# Patient Record
Sex: MALE | Race: WHITE
[De-identification: names, ages, dates, MRNs, and addresses within clinical notes are randomized per-mention and may not be internally consistent; named-entity substitution may affect disease eponyms.]

---

## 2020-04-16 PROBLEM — Z00.00 ENCOUNTER FOR PREVENTIVE HEALTH EXAMINATION: Status: ACTIVE | Noted: 2020-04-16

## 2020-04-21 ENCOUNTER — APPOINTMENT (OUTPATIENT)
Dept: UROLOGY | Facility: CLINIC | Age: 55
End: 2020-04-21
Payer: COMMERCIAL

## 2020-04-21 VITALS — TEMPERATURE: 98.1 F

## 2020-04-21 VITALS
BODY MASS INDEX: 25.84 KG/M2 | SYSTOLIC BLOOD PRESSURE: 121 MMHG | HEART RATE: 67 BPM | DIASTOLIC BLOOD PRESSURE: 73 MMHG | WEIGHT: 195 LBS | TEMPERATURE: 98 F | RESPIRATION RATE: 17 BRPM | HEIGHT: 73 IN

## 2020-04-21 DIAGNOSIS — Z78.9 OTHER SPECIFIED HEALTH STATUS: ICD-10-CM

## 2020-04-21 DIAGNOSIS — N40.0 BENIGN PROSTATIC HYPERPLASIA WITHOUT LOWER URINARY TRACT SYMPMS: ICD-10-CM

## 2020-04-21 PROCEDURE — 99203 OFFICE O/P NEW LOW 30 MIN: CPT

## 2020-04-21 RX ORDER — PSYLLIUM HUSK 0.4 G
CAPSULE ORAL
Refills: 0 | Status: ACTIVE | COMMUNITY

## 2020-04-21 RX ORDER — MULTIVITAMIN
TABLET ORAL
Refills: 0 | Status: ACTIVE | COMMUNITY

## 2020-04-21 NOTE — HISTORY OF PRESENT ILLNESS
[Nocturia] : nocturia [FreeTextEntry1] : History of spermatocele last year . PSA drawn 2.3 in 2019. Repeat this year in march was 3.8 . 3.6 in 4/1 4/2020 . He is complaining of a slow stream . He takes benadryl every night for sleep

## 2020-04-21 NOTE — PHYSICAL EXAM
[Urethral Meatus] : meatus normal [Penis Abnormality] : normal circumcised penis [Testes Mass (___cm)] : there were no testicular masses [General Appearance - Well Developed] : well developed [General Appearance - Well Nourished] : well nourished [Heart Rate And Rhythm] : Heart rate and rhythm were normal [] : no respiratory distress [Bowel Sounds] : normal bowel sounds [FreeTextEntry1] : dorsal plaque 2.5 cm left hemiscrotom  [Normal Station and Gait] : the gait and station were normal for the patient's age [No Focal Deficits] : no focal deficits [Skin Color & Pigmentation] : normal skin color and pigmentation [No Palpable Adenopathy] : no palpable adenopathy [Oriented To Time, Place, And Person] : oriented to person, place, and time

## 2020-04-21 NOTE — REVIEW OF SYSTEMS
[see HPI] : see HPI [Wake up at night to urinate  How many times?  ___] : wakes up to urinate [unfilled] times during the night [Interrupted urine stream] : interrupted urine stream [Slow urine stream] : slow urine stream [Negative] : Heme/Lymph

## 2020-04-21 NOTE — ASSESSMENT
[FreeTextEntry1] : He has a elevated PSA . We discussed that the Benadryl will affect the stream . We will order the MRI due to PSA elevating by 1 point in the past year . \par He understands the plan .\par I-PSS 11\par QOL 2

## 2020-04-29 ENCOUNTER — APPOINTMENT (OUTPATIENT)
Dept: MRI IMAGING | Facility: CLINIC | Age: 55
End: 2020-04-29

## 2020-05-26 ENCOUNTER — OUTPATIENT (OUTPATIENT)
Dept: OUTPATIENT SERVICES | Facility: HOSPITAL | Age: 55
LOS: 1 days | End: 2020-05-26
Payer: COMMERCIAL

## 2020-05-26 ENCOUNTER — APPOINTMENT (OUTPATIENT)
Dept: MRI IMAGING | Facility: CLINIC | Age: 55
End: 2020-05-26
Payer: COMMERCIAL

## 2020-05-26 ENCOUNTER — RESULT REVIEW (OUTPATIENT)
Age: 55
End: 2020-05-26

## 2020-05-26 DIAGNOSIS — Z00.8 ENCOUNTER FOR OTHER GENERAL EXAMINATION: ICD-10-CM

## 2020-05-26 DIAGNOSIS — R97.20 ELEVATED PROSTATE SPECIFIC ANTIGEN [PSA]: ICD-10-CM

## 2020-05-26 PROCEDURE — 72197 MRI PELVIS W/O & W/DYE: CPT | Mod: 26

## 2020-05-26 PROCEDURE — 76377 3D RENDER W/INTRP POSTPROCES: CPT

## 2020-05-26 PROCEDURE — 72197 MRI PELVIS W/O & W/DYE: CPT

## 2020-05-26 PROCEDURE — A9585: CPT

## 2020-05-26 PROCEDURE — 76377 3D RENDER W/INTRP POSTPROCES: CPT | Mod: 26

## 2020-06-11 ENCOUNTER — OUTPATIENT (OUTPATIENT)
Dept: OUTPATIENT SERVICES | Facility: HOSPITAL | Age: 55
LOS: 1 days | End: 2020-06-11
Payer: COMMERCIAL

## 2020-06-11 ENCOUNTER — APPOINTMENT (OUTPATIENT)
Dept: UROLOGY | Facility: CLINIC | Age: 55
End: 2020-06-11
Payer: COMMERCIAL

## 2020-06-11 VITALS — SYSTOLIC BLOOD PRESSURE: 120 MMHG | HEART RATE: 62 BPM | DIASTOLIC BLOOD PRESSURE: 86 MMHG

## 2020-06-11 VITALS — RESPIRATION RATE: 17 BRPM | HEART RATE: 75 BPM | DIASTOLIC BLOOD PRESSURE: 94 MMHG | SYSTOLIC BLOOD PRESSURE: 134 MMHG

## 2020-06-11 VITALS — TEMPERATURE: 97.4 F

## 2020-06-11 DIAGNOSIS — R35.0 FREQUENCY OF MICTURITION: ICD-10-CM

## 2020-06-11 DIAGNOSIS — R97.20 ELEVATED PROSTATE, SPECIFIC ANTIGEN [PSA]: ICD-10-CM

## 2020-06-11 PROCEDURE — 76872 US TRANSRECTAL: CPT | Mod: 26

## 2020-06-11 PROCEDURE — 55700: CPT

## 2020-06-11 PROCEDURE — 76377 3D RENDER W/INTRP POSTPROCES: CPT | Mod: 26

## 2020-06-11 PROCEDURE — 55700: CPT | Mod: 22

## 2020-06-11 PROCEDURE — 76942 ECHO GUIDE FOR BIOPSY: CPT | Mod: 59

## 2020-06-11 PROCEDURE — 76942 ECHO GUIDE FOR BIOPSY: CPT | Mod: 26,59

## 2020-06-11 PROCEDURE — 76872 US TRANSRECTAL: CPT

## 2020-06-16 LAB — CORE LAB BIOPSY: NORMAL

## 2020-06-16 RX ORDER — DIAZEPAM 5 MG/1
5 TABLET ORAL
Qty: 1 | Refills: 0 | Status: DISCONTINUED | COMMUNITY
Start: 2020-06-08 | End: 2020-06-16

## 2020-06-17 DIAGNOSIS — R97.20 ELEVATED PROSTATE SPECIFIC ANTIGEN [PSA]: ICD-10-CM

## 2020-06-17 DIAGNOSIS — R93.5 ABNORMAL FINDINGS ON DIAGNOSTIC IMAGING OF OTHER ABDOMINAL REGIONS, INCLUDING RETROPERITONEUM: ICD-10-CM

## 2020-06-23 ENCOUNTER — APPOINTMENT (OUTPATIENT)
Dept: UROLOGY | Facility: CLINIC | Age: 55
End: 2020-06-23
Payer: COMMERCIAL

## 2020-06-23 VITALS
WEIGHT: 196 LBS | BODY MASS INDEX: 25.98 KG/M2 | DIASTOLIC BLOOD PRESSURE: 79 MMHG | SYSTOLIC BLOOD PRESSURE: 137 MMHG | HEIGHT: 73 IN | HEART RATE: 68 BPM

## 2020-06-23 VITALS — TEMPERATURE: 97.8 F

## 2020-06-23 DIAGNOSIS — R93.5 ABNORMAL FINDINGS ON DIAGNOSTIC IMAGING OF OTHER ABDOMINAL REGIONS, INCLUDING RETROPERITONEUM: ICD-10-CM

## 2020-06-23 DIAGNOSIS — Z78.9 OTHER SPECIFIED HEALTH STATUS: ICD-10-CM

## 2020-06-23 DIAGNOSIS — C61 MALIGNANT NEOPLASM OF PROSTATE: ICD-10-CM

## 2020-06-23 PROCEDURE — 99215 OFFICE O/P EST HI 40 MIN: CPT

## 2020-10-25 ENCOUNTER — INPATIENT (INPATIENT)
Facility: HOSPITAL | Age: 55
LOS: 0 days | Discharge: HOME | DRG: 698 | End: 2020-10-26
Attending: INTERNAL MEDICINE | Admitting: INTERNAL MEDICINE
Payer: COMMERCIAL

## 2020-10-25 VITALS
HEART RATE: 72 BPM | WEIGHT: 186.95 LBS | TEMPERATURE: 98 F | SYSTOLIC BLOOD PRESSURE: 140 MMHG | RESPIRATION RATE: 22 BRPM | DIASTOLIC BLOOD PRESSURE: 110 MMHG | HEIGHT: 73 IN | OXYGEN SATURATION: 100 %

## 2020-10-25 LAB
ALBUMIN SERPL ELPH-MCNC: 3.3 G/DL — SIGNIFICANT CHANGE UP (ref 3.3–5)
ALP SERPL-CCNC: 60 U/L — SIGNIFICANT CHANGE UP (ref 40–120)
ALT FLD-CCNC: 25 U/L — SIGNIFICANT CHANGE UP (ref 12–78)
ANION GAP SERPL CALC-SCNC: 8 MMOL/L — SIGNIFICANT CHANGE UP (ref 5–17)
APTT BLD: 24.6 SEC — LOW (ref 27.5–35.5)
AST SERPL-CCNC: 21 U/L — SIGNIFICANT CHANGE UP (ref 15–37)
BASOPHILS # BLD AUTO: 0.08 K/UL — SIGNIFICANT CHANGE UP (ref 0–0.2)
BASOPHILS NFR BLD AUTO: 0.4 % — SIGNIFICANT CHANGE UP (ref 0–2)
BILIRUB SERPL-MCNC: 0.8 MG/DL — SIGNIFICANT CHANGE UP (ref 0.2–1.2)
BUN SERPL-MCNC: 13 MG/DL — SIGNIFICANT CHANGE UP (ref 7–23)
CALCIUM SERPL-MCNC: 8.6 MG/DL — SIGNIFICANT CHANGE UP (ref 8.5–10.1)
CHLORIDE SERPL-SCNC: 106 MMOL/L — SIGNIFICANT CHANGE UP (ref 96–108)
CO2 SERPL-SCNC: 26 MMOL/L — SIGNIFICANT CHANGE UP (ref 22–31)
CREAT SERPL-MCNC: 1.16 MG/DL — SIGNIFICANT CHANGE UP (ref 0.5–1.3)
EOSINOPHIL # BLD AUTO: 0.49 K/UL — SIGNIFICANT CHANGE UP (ref 0–0.5)
EOSINOPHIL NFR BLD AUTO: 2.7 % — SIGNIFICANT CHANGE UP (ref 0–6)
GLUCOSE SERPL-MCNC: 131 MG/DL — HIGH (ref 70–99)
HCT VFR BLD CALC: 36.4 % — LOW (ref 39–50)
HGB BLD-MCNC: 12.1 G/DL — LOW (ref 13–17)
IMM GRANULOCYTES NFR BLD AUTO: 0.5 % — SIGNIFICANT CHANGE UP (ref 0–1.5)
INR BLD: 1.06 RATIO — SIGNIFICANT CHANGE UP (ref 0.88–1.16)
LYMPHOCYTES # BLD AUTO: 1.46 K/UL — SIGNIFICANT CHANGE UP (ref 1–3.3)
LYMPHOCYTES # BLD AUTO: 8.2 % — LOW (ref 13–44)
MCHC RBC-ENTMCNC: 30.3 PG — SIGNIFICANT CHANGE UP (ref 27–34)
MCHC RBC-ENTMCNC: 33.2 GM/DL — SIGNIFICANT CHANGE UP (ref 32–36)
MCV RBC AUTO: 91 FL — SIGNIFICANT CHANGE UP (ref 80–100)
MONOCYTES # BLD AUTO: 1.23 K/UL — HIGH (ref 0–0.9)
MONOCYTES NFR BLD AUTO: 6.9 % — SIGNIFICANT CHANGE UP (ref 2–14)
NEUTROPHILS # BLD AUTO: 14.55 K/UL — HIGH (ref 1.8–7.4)
NEUTROPHILS NFR BLD AUTO: 81.3 % — HIGH (ref 43–77)
PLATELET # BLD AUTO: 281 K/UL — SIGNIFICANT CHANGE UP (ref 150–400)
POTASSIUM SERPL-MCNC: 3.5 MMOL/L — SIGNIFICANT CHANGE UP (ref 3.5–5.3)
POTASSIUM SERPL-SCNC: 3.5 MMOL/L — SIGNIFICANT CHANGE UP (ref 3.5–5.3)
PROT SERPL-MCNC: 6.1 GM/DL — SIGNIFICANT CHANGE UP (ref 6–8.3)
PROTHROM AB SERPL-ACNC: 12.3 SEC — SIGNIFICANT CHANGE UP (ref 10.6–13.6)
RBC # BLD: 4 M/UL — LOW (ref 4.2–5.8)
RBC # FLD: 11.7 % — SIGNIFICANT CHANGE UP (ref 10.3–14.5)
SODIUM SERPL-SCNC: 140 MMOL/L — SIGNIFICANT CHANGE UP (ref 135–145)
WBC # BLD: 17.9 K/UL — HIGH (ref 3.8–10.5)
WBC # FLD AUTO: 17.9 K/UL — HIGH (ref 3.8–10.5)

## 2020-10-25 PROCEDURE — 74177 CT ABD & PELVIS W/CONTRAST: CPT | Mod: 26

## 2020-10-25 RX ORDER — ONDANSETRON 8 MG/1
4 TABLET, FILM COATED ORAL ONCE
Refills: 0 | Status: COMPLETED | OUTPATIENT
Start: 2020-10-25 | End: 2020-10-25

## 2020-10-25 RX ORDER — SODIUM CHLORIDE 9 MG/ML
1000 INJECTION INTRAMUSCULAR; INTRAVENOUS; SUBCUTANEOUS ONCE
Refills: 0 | Status: COMPLETED | OUTPATIENT
Start: 2020-10-25 | End: 2020-10-25

## 2020-10-25 RX ORDER — MORPHINE SULFATE 50 MG/1
4 CAPSULE, EXTENDED RELEASE ORAL ONCE
Refills: 0 | Status: DISCONTINUED | OUTPATIENT
Start: 2020-10-25 | End: 2020-10-25

## 2020-10-25 RX ADMIN — SODIUM CHLORIDE 1000 MILLILITER(S): 9 INJECTION INTRAMUSCULAR; INTRAVENOUS; SUBCUTANEOUS at 22:51

## 2020-10-25 RX ADMIN — ONDANSETRON 4 MILLIGRAM(S): 8 TABLET, FILM COATED ORAL at 21:14

## 2020-10-25 RX ADMIN — MORPHINE SULFATE 4 MILLIGRAM(S): 50 CAPSULE, EXTENDED RELEASE ORAL at 21:14

## 2020-10-25 RX ADMIN — SODIUM CHLORIDE 1000 MILLILITER(S): 9 INJECTION INTRAMUSCULAR; INTRAVENOUS; SUBCUTANEOUS at 23:51

## 2020-10-25 RX ADMIN — MORPHINE SULFATE 4 MILLIGRAM(S): 50 CAPSULE, EXTENDED RELEASE ORAL at 20:44

## 2020-10-25 RX ADMIN — ONDANSETRON 4 MILLIGRAM(S): 8 TABLET, FILM COATED ORAL at 22:51

## 2020-10-25 NOTE — ED PROVIDER NOTE - PROGRESS NOTE DETAILS
Discussed with patient's physician. [phone].  Will let know what CT shows. Cameron Villagran D.O. AS:  Received patient at shift change pending CT findings. Pt stable, complains of intermittent sharp stabbing pains.  D/w Dr Guzmán (surgery [facility]) and reviewed CT findings.  Most likely post operative changes but moderate amount of hemoperitoneum less usual.  Would recommend repeating CBC as the fluid is likely old and collected from surgery. AS:  pt remains in pain, episodes of sharp pain come and go.  nontender on exam but cannot stand secondary to pain.  gambino catheter continues to drain and he has no complaints of pain in the penis AS:  case d/w general surgery resident; recommends repeating CT as CTA to r/o active bleeding with the hemoperitoneum and patient's level of pain as general surgery would likely not take this stable pt to OR for ex lap and pt would otherwise require IR if CTA abnormal. AS:  case d/w Dr Romano, covering urology for HH.  Recommends a dose of oxybutinin as symptoms are likely secondary to bladder spasms.  Cannot say that the tug on his catheter would otherwise cause active bleeding leading to the hemoperitoneum.  Recommends small amount of irrigation to the gambino to verify no issues with gambino and reassess after oxybutinin.  If pt still in pain would suggest admission to medicine for pain control case d/w Dr Helm, will admit to medicine service for pain control and further monitoring

## 2020-10-25 NOTE — ED ADULT NURSE NOTE - CHPI ED NUR SYMPTOMS NEG
no nausea/no fever/no chills/no diarrhea/no dysuria/no vomiting/no hematuria/no abdominal distension/no blood in stool/no burning urination

## 2020-10-25 NOTE — ED ADULT NURSE NOTE - CHIEF COMPLAINT QUOTE
history of prostate ca, had surgery on Tuesday at [place], complaining of Lindo cathter pain lower abdominal pain, onset began earlier this evening 100mcg fentanyl given by ems pta

## 2020-10-25 NOTE — ED ADULT NURSE NOTE - OBJECTIVE STATEMENT
Patient A&Ox4 brought in by EMS c/o abdominal pain.  Patient had prostate surgery on tuesday at [facility], has urinary catheter in place.  Patient reports adjusting pants in car and pulled catheter, has been having intermittent waves of left lower sided sharp abdominal pain rating 10/10.  Patient got 10 mcg of fentanyl PTA by EMS, 18G IV in L AC in place upon arrival.  Patient has multiple incision wounds on abdomen that are red with no Patient A&Ox4 brought in by EMS c/o abdominal pain.  Patient had prostatectomy on tuesday at [place], has urinary catheter in place.  Patient reports adjusting pants in car and accidentally pulled catheter, has been having intermittent waves of left lower sided sharp abdominal pain rating 10/10.   Patient reports feeling faint and diaphoretic due to pain.  Patient got 10 mcg of fentanyl PTA by EMS, 18G IV in L AC in place upon arrival.  Patient has multiple incisions on abdomen that are pink with no drainage noted.  PMH prostate Ca.

## 2020-10-25 NOTE — ED PROVIDER NOTE - OBJECTIVE STATEMENT
56 y/o male with a PMHx of prostate CA, presents to the ED BIBEMS from home c/o lower abdominal pain today. Pt reports recent robotic prostatectomy at [facility] in [facility] for prostate CA. Pt states tonight he accidentally pulled out his Lindo catheter and following doing so had abdominal pain up to 10/10 in severity. States he almost passed out because of the pain and called EMS to be brought to the ED for further evaluation. EMS administered 100mcg fentanyl en route. No other complaints at this time. Allergies: Penicillin

## 2020-10-25 NOTE — ED ADULT TRIAGE NOTE - CHIEF COMPLAINT QUOTE
history of prostate ca, had surgery on Tuesday at [place], complaining of Lindo cathter pain lower abdominal pain, onset began earlier this evening 100mcg fentanyl given by ems pta

## 2020-10-25 NOTE — ED PROVIDER NOTE - SKIN, MLM
Skin normal color for race, warm, dry and intact. No evidence of rash. +surgical incisions well healing, no signs of infection.

## 2020-10-25 NOTE — ED PROVIDER NOTE - CARE PLAN
Principal Discharge DX:	Bladder spasms  Secondary Diagnosis:	Abdominal pain  Secondary Diagnosis:	Hemoperitoneum

## 2020-10-26 ENCOUNTER — TRANSCRIPTION ENCOUNTER (OUTPATIENT)
Age: 55
End: 2020-10-26

## 2020-10-26 VITALS
SYSTOLIC BLOOD PRESSURE: 128 MMHG | OXYGEN SATURATION: 100 % | DIASTOLIC BLOOD PRESSURE: 73 MMHG | TEMPERATURE: 98 F | RESPIRATION RATE: 18 BRPM | HEART RATE: 75 BPM

## 2020-10-26 DIAGNOSIS — N32.89 OTHER SPECIFIED DISORDERS OF BLADDER: ICD-10-CM

## 2020-10-26 LAB
APPEARANCE UR: CLEAR — SIGNIFICANT CHANGE UP
BASOPHILS # BLD AUTO: 0.03 K/UL — SIGNIFICANT CHANGE UP (ref 0–0.2)
BASOPHILS NFR BLD AUTO: 0.2 % — SIGNIFICANT CHANGE UP (ref 0–2)
BILIRUB UR-MCNC: NEGATIVE — SIGNIFICANT CHANGE UP
COLOR SPEC: YELLOW — SIGNIFICANT CHANGE UP
DIFF PNL FLD: ABNORMAL
EOSINOPHIL # BLD AUTO: 0.05 K/UL — SIGNIFICANT CHANGE UP (ref 0–0.5)
EOSINOPHIL NFR BLD AUTO: 0.4 % — SIGNIFICANT CHANGE UP (ref 0–6)
GLUCOSE UR QL: NEGATIVE MG/DL — SIGNIFICANT CHANGE UP
HCT VFR BLD CALC: 31.8 % — LOW (ref 39–50)
HGB BLD-MCNC: 10.6 G/DL — LOW (ref 13–17)
IMM GRANULOCYTES NFR BLD AUTO: 0.4 % — SIGNIFICANT CHANGE UP (ref 0–1.5)
KETONES UR-MCNC: NEGATIVE — SIGNIFICANT CHANGE UP
LACTATE SERPL-SCNC: 1.3 MMOL/L — SIGNIFICANT CHANGE UP (ref 0.7–2)
LEUKOCYTE ESTERASE UR-ACNC: ABNORMAL
LYMPHOCYTES # BLD AUTO: 0.58 K/UL — LOW (ref 1–3.3)
LYMPHOCYTES # BLD AUTO: 4.1 % — LOW (ref 13–44)
MCHC RBC-ENTMCNC: 30.5 PG — SIGNIFICANT CHANGE UP (ref 27–34)
MCHC RBC-ENTMCNC: 33.3 GM/DL — SIGNIFICANT CHANGE UP (ref 32–36)
MCV RBC AUTO: 91.4 FL — SIGNIFICANT CHANGE UP (ref 80–100)
MONOCYTES # BLD AUTO: 0.8 K/UL — SIGNIFICANT CHANGE UP (ref 0–0.9)
MONOCYTES NFR BLD AUTO: 5.7 % — SIGNIFICANT CHANGE UP (ref 2–14)
NEUTROPHILS # BLD AUTO: 12.56 K/UL — HIGH (ref 1.8–7.4)
NEUTROPHILS NFR BLD AUTO: 89.2 % — HIGH (ref 43–77)
NITRITE UR-MCNC: NEGATIVE — SIGNIFICANT CHANGE UP
PH UR: 6.5 — SIGNIFICANT CHANGE UP (ref 5–8)
PLATELET # BLD AUTO: 241 K/UL — SIGNIFICANT CHANGE UP (ref 150–400)
PROT UR-MCNC: 15 MG/DL
RBC # BLD: 3.48 M/UL — LOW (ref 4.2–5.8)
RBC # FLD: 11.8 % — SIGNIFICANT CHANGE UP (ref 10.3–14.5)
SARS-COV-2 IGG SERPL QL IA: NEGATIVE — SIGNIFICANT CHANGE UP
SARS-COV-2 IGM SERPL IA-ACNC: <0.1 INDEX — SIGNIFICANT CHANGE UP
SARS-COV-2 RNA SPEC QL NAA+PROBE: SIGNIFICANT CHANGE UP
SP GR SPEC: 1.01 — SIGNIFICANT CHANGE UP (ref 1.01–1.02)
UROBILINOGEN FLD QL: NEGATIVE MG/DL — SIGNIFICANT CHANGE UP
WBC # BLD: 14.07 K/UL — HIGH (ref 3.8–10.5)
WBC # FLD AUTO: 14.07 K/UL — HIGH (ref 3.8–10.5)

## 2020-10-26 PROCEDURE — 99236 HOSP IP/OBS SAME DATE HI 85: CPT

## 2020-10-26 PROCEDURE — 74174 CTA ABD&PLVS W/CONTRAST: CPT | Mod: 26

## 2020-10-26 RX ORDER — OXYCODONE AND ACETAMINOPHEN 5; 325 MG/1; MG/1
1 TABLET ORAL EVERY 4 HOURS
Refills: 0 | Status: DISCONTINUED | OUTPATIENT
Start: 2020-10-26 | End: 2020-10-26

## 2020-10-26 RX ORDER — OXYBUTYNIN CHLORIDE 5 MG
5 TABLET ORAL
Refills: 0 | Status: DISCONTINUED | OUTPATIENT
Start: 2020-10-26 | End: 2020-10-26

## 2020-10-26 RX ORDER — SENNA PLUS 8.6 MG/1
2 TABLET ORAL AT BEDTIME
Refills: 0 | Status: DISCONTINUED | OUTPATIENT
Start: 2020-10-26 | End: 2020-10-26

## 2020-10-26 RX ORDER — ONDANSETRON 8 MG/1
1 TABLET, FILM COATED ORAL
Qty: 6 | Refills: 0
Start: 2020-10-26 | End: 2020-10-27

## 2020-10-26 RX ORDER — IBUPROFEN 200 MG
1 TABLET ORAL
Qty: 0 | Refills: 0 | DISCHARGE

## 2020-10-26 RX ORDER — PREGABALIN 225 MG/1
1000 CAPSULE ORAL DAILY
Refills: 0 | Status: DISCONTINUED | OUTPATIENT
Start: 2020-10-26 | End: 2020-10-26

## 2020-10-26 RX ORDER — ACETAMINOPHEN 500 MG
650 TABLET ORAL EVERY 8 HOURS
Refills: 0 | Status: DISCONTINUED | OUTPATIENT
Start: 2020-10-26 | End: 2020-10-26

## 2020-10-26 RX ORDER — CEFTRIAXONE 500 MG/1
1000 INJECTION, POWDER, FOR SOLUTION INTRAMUSCULAR; INTRAVENOUS ONCE
Refills: 0 | Status: DISCONTINUED | OUTPATIENT
Start: 2020-10-26 | End: 2020-10-26

## 2020-10-26 RX ORDER — PREGABALIN 225 MG/1
1 CAPSULE ORAL
Qty: 0 | Refills: 0 | DISCHARGE

## 2020-10-26 RX ORDER — OXYBUTYNIN CHLORIDE 5 MG
10 TABLET ORAL ONCE
Refills: 0 | Status: COMPLETED | OUTPATIENT
Start: 2020-10-26 | End: 2020-10-26

## 2020-10-26 RX ORDER — OXYBUTYNIN CHLORIDE 5 MG
1 TABLET ORAL
Qty: 30 | Refills: 0
Start: 2020-10-26 | End: 2020-11-04

## 2020-10-26 RX ORDER — SENNA PLUS 8.6 MG/1
2 TABLET ORAL
Qty: 10 | Refills: 0
Start: 2020-10-26 | End: 2020-10-30

## 2020-10-26 RX ORDER — CEFTRIAXONE 500 MG/1
1000 INJECTION, POWDER, FOR SOLUTION INTRAMUSCULAR; INTRAVENOUS ONCE
Refills: 0 | Status: COMPLETED | OUTPATIENT
Start: 2020-10-26 | End: 2020-10-26

## 2020-10-26 RX ORDER — POLYETHYLENE GLYCOL 3350 17 G/17G
17 POWDER, FOR SOLUTION ORAL
Refills: 0 | Status: DISCONTINUED | OUTPATIENT
Start: 2020-10-26 | End: 2020-10-26

## 2020-10-26 RX ORDER — MORPHINE SULFATE 50 MG/1
4 CAPSULE, EXTENDED RELEASE ORAL ONCE
Refills: 0 | Status: DISCONTINUED | OUTPATIENT
Start: 2020-10-26 | End: 2020-10-26

## 2020-10-26 RX ORDER — OXYBUTYNIN CHLORIDE 5 MG
1 TABLET ORAL
Qty: 20 | Refills: 0
Start: 2020-10-26 | End: 2020-11-04

## 2020-10-26 RX ORDER — MORPHINE SULFATE 50 MG/1
4 CAPSULE, EXTENDED RELEASE ORAL EVERY 6 HOURS
Refills: 0 | Status: DISCONTINUED | OUTPATIENT
Start: 2020-10-26 | End: 2020-10-26

## 2020-10-26 RX ORDER — ONDANSETRON 8 MG/1
4 TABLET, FILM COATED ORAL EVERY 6 HOURS
Refills: 0 | Status: DISCONTINUED | OUTPATIENT
Start: 2020-10-26 | End: 2020-10-26

## 2020-10-26 RX ORDER — ACETAMINOPHEN 500 MG
2 TABLET ORAL
Qty: 0 | Refills: 0 | DISCHARGE
Start: 2020-10-26

## 2020-10-26 RX ADMIN — CEFTRIAXONE 1000 MILLIGRAM(S): 500 INJECTION, POWDER, FOR SOLUTION INTRAMUSCULAR; INTRAVENOUS at 04:55

## 2020-10-26 RX ADMIN — Medication 10 MILLIGRAM(S): at 04:56

## 2020-10-26 RX ADMIN — MORPHINE SULFATE 4 MILLIGRAM(S): 50 CAPSULE, EXTENDED RELEASE ORAL at 04:56

## 2020-10-26 RX ADMIN — MORPHINE SULFATE 4 MILLIGRAM(S): 50 CAPSULE, EXTENDED RELEASE ORAL at 05:26

## 2020-10-26 RX ADMIN — ONDANSETRON 4 MILLIGRAM(S): 8 TABLET, FILM COATED ORAL at 10:06

## 2020-10-26 NOTE — DISCHARGE NOTE PROVIDER - NSDCCPCAREPLAN_GEN_ALL_CORE_FT
PRINCIPAL DISCHARGE DIAGNOSIS  Diagnosis: Bladder spasms  Assessment and Plan of Treatment: pain meds, oxybutynin

## 2020-10-26 NOTE — H&P ADULT - ASSESSMENT
Severe abdo pain s/p tugging at gambino  s/p robotic prostatectomy last week/[facility]/Dr Garvin    PLan:  morphine IV/percocet   oxybutynin   discussed with DR Romano and RN   OOB to chair/ambulate as tolerated  if pain better then plan to dc home later in the day and f/u with Dr Garvin  CT imaging does not show any active bleeding    Severe abdo pain s/p tugging at gambino  s/p robotic prostatectomy last week/[facility]/Dr Garvin  bladder wall thickening; hemoperitoneum - post op finding     PLan:  morphine IV/percocet   oxybutynin   CT imaging does not show any active bleeding   for cystitis cont rocephin here - will send home on ceftin for a week as he has gambino etc.  discussed with DR Romano and RN   OOB to chair/ambulate as tolerated  if pain better then plan to dc home later in the day and f/u with Dr Garvin

## 2020-10-26 NOTE — ED ADULT NURSE REASSESSMENT NOTE - NS ED NURSE REASSESS COMMENT FT1
Lindo irrigated with about 30 mL of sterile water, no discomfort noted by patient.  Lindo catheter draining well with clear, yellow urine.

## 2020-10-26 NOTE — H&P ADULT - NSHPPHYSICALEXAM_GEN_ALL_CORE
PHYSICAL EXAM:  GENERAL: NAD, able to lie flat in bed  HEAD:  Atraumatic, Normocephalic  EYES: EOMI, PERRLA, normal sclera  ENT: Moist mucous membranes  NECK: Supple, No JVD, no nuchal rigidity  CHEST/LUNG: Clear to auscultation bilaterally; No rales, rhonchi, wheezing, or rubs. Unlabored respirations  HEART: Regular rate and rhythm; No murmurs, rubs, or gallops  ABDOMEN: Bowel sounds present; Soft, tenderness low abdo; post-surgical, incision sites are healthy   Lindo with clear urine in bag   EXTREMITIES:  no pitting bilaterally  NERVOUS SYSTEM:  Alert & Oriented X3, speech clear. No focal motor or sensory deficits  MSK: FROM all 4 extremities, full and equal strength  SKIN: No rashes or lesions

## 2020-10-26 NOTE — ED ADULT NURSE REASSESSMENT NOTE - NS ED NURSE REASSESS COMMENT FT1
Pt tolerated PO intake without complications. Pt is now resting comfortably in his bed without any complications. Pt denies any pain or discomfort. Safety and comfort measures in place. Hourly rounding will be done on my time. Will continue to monitor.

## 2020-10-26 NOTE — DISCHARGE NOTE NURSING/CASE MANAGEMENT/SOCIAL WORK - PATIENT PORTAL LINK FT
You can access the FollowMyHealth Patient Portal offered by [facility] by registering at the following website: [email or web address]. By joining [employer]’s FollowMyHealth portal, you will also be able to view your health information using other applications (apps) compatible with our system.

## 2020-10-26 NOTE — PHARMACOTHERAPY INTERVENTION NOTE - COMMENTS
med history complete, reviewed medications with patient and confirmed with doctor first med profile, all medication related questions answered

## 2020-10-26 NOTE — DISCHARGE NOTE PROVIDER - HOSPITAL COURSE
Pleasant 56 y/o male with  prostate CA, s/p robotic prostatectomy at [facility] on 10/20/20 by Dr Rivera.  Pt well until last evening when  he accidentally tugged at his  Gambino catheter, and several mins later developed such excruciating low abdo pain that he couldn't breathe and he  almost passed out. Per ER notes pt was given fentanyl by EMS.   Pt got oxybutynin and morphine here, pain is tolerable, low abdo feels a little tender. No cp palps sob is passing gas. No pain in the urethra.   10 point ROS is otherwise negative.    PMH:  denies any h/o MI CAD CVA or other Ca  Prostate Ca    FH:  parents and sibs are healthy    SH:  never smoked; occ red wine; no recreational drugsGENERAL: NAD, able to lie flat in bed  HEAD:  Atraumatic, Normocephalic  EYES: EOMI, PERRLA, normal sclera  ENT: Moist mucous membranes  NECK: Supple, No JVD, no nuchal rigidity  CHEST/LUNG: Clear to auscultation bilaterally; No rales, rhonchi, wheezing, or rubs. Unlabored respirations  HEART: Regular rate and rhythm; No murmurs, rubs, or gallops  ABDOMEN: Bowel sounds present; Soft, tenderness low abdo; post-surgical, incision sites are healthy   Gambino with clear urine in bag   EXTREMITIES:  no pitting bilaterally  NERVOUS SYSTEM:  Alert & Oriented X3, speech clear. No focal motor or sensory deficits  MSK: FROM all 4 extremities, full and equal strength  SKIN: No rashes or lesionsLABS: All Labs Reviewed:                        10.6   14.07 )-----------( 241      ( 26 Oct 2020 01:56 )             31.8     140  |  106  |  13  ----------------------------<  131<H>  3.5   |  26  |  1.16      RADIOLOGY/EKG:  < from: CT Angio Abdomen and Pelvis w/ IV Cont (10.26.20 @ 05:22) >  1. Small to moderate amount of abdominal and pelvic hemoperitoneum, not significant changed, without evidence of active intravenous contrast extravasation.  2. Circumferential urinary bladder wall thickening which could be postprocedural in nature versus a cystitis. Correlate with urinalysisSevere abdo pain s/p tugging at gambino      A/P    s/p robotic prostatectomy last week/[facility]/Dr Rivera  bladder wall thickening; hemoperitoneum - post op finding     PLan:  morphine IV/percocet  - pain now controlled  and pt stable for discharge   oxybutynin per Urology, will continue   CT imaging does not show any active bleeding   for cystitis got rocephin here   will hold off on further antibiotics   discussed with DR Romano and RN   OOB to chair/ambulate as tolerated  as  pain better plan to dc home later in the day and f/u with Dr Rivera    POC discussed with patient, RN and Dr Romano   Pt has percocet at home     time spent on discharge - 55 mins

## 2020-10-26 NOTE — ED ADULT NURSE REASSESSMENT NOTE - NS ED NURSE REASSESS COMMENT FT1
pt had an episode of emesis. Pt stated that he felt better and wanted to try and ambulate. pt ambulated x2 around the er. Pt denies any pain or discomfort during ambulation trail. Updated Dr. Guerrero. MD stated to allow pt to eat/ tolerate lunch. Pt will possible be discharged home. Safety and comfort measures in place. Hourly rounding will be done on my time. Will continue to monitor.

## 2020-10-26 NOTE — ED ADULT NURSE REASSESSMENT NOTE - NS ED NURSE REASSESS COMMENT FT1
Pt being D/C to be home. IV removed and bleeding. Discharged instruction provided to pt, all questions answered. Pt verbalized understanding. Pt ambulated to the exit without any complications.

## 2020-10-26 NOTE — CONSULT NOTE ADULT - SUBJECTIVE AND OBJECTIVE BOX
UROLOGY CONSULTATION    BECKA IBRAHIM  [id number]    [facility]  Patient is a 55y yo Male who is admitted for abdominal pain    Patient [place] [facility] [place].  Yesterday he had an accidental tug on his gambino catheter and subsequently developed severe intermittent, sharp lower abdominal pain radiating to his shoulders  CT showed a lymphocele and small amount of pneumoperitoneum.    Patient seen and examined at bedside.     Current complaints: none, overall he feels better    +BMs, flatus    Denies dysuria, urgency, frequency, gross hematuria, fevers, chills, nausea, vomiting or weight loss    ROS  12 point ROS negative except that outlined in HPI    PMHX  Other disorder of bladder        MEDS  ondansetron Injectable 4 milliGRAM(s) IV Push every 6 hours PRN      Allergies  penicillin (Rash)        VITALS  T(C): 36.8 (10-26-20 @ 07:21), Max: 36.8 (10-26-20 @ 00:52)  HR: 67 (10-26-20 @ 07:21)  BP: 124/68 (10-26-20 @ 07:21)  RR: 18 (10-26-20 @ 07:21)  SpO2: 98% (10-26-20 @ 07:21)      Gen: middle aged Male in NAD, well nourished  HEENT: normocephalic, anicteric sclera, moist mucus membranes; hearing intact  Chest: non labored breathing  Abd: +BS, incisions healing well without erythema or drainage, soft, mild lower abdominal tenderness to deep palpation, ND, no rebound or guarding  : no suprapubic distension or tenderness, gambino in situ draining clear yellow urine, bilateral testes without tenderness or swelling  Psych: AAOx3, normal affect & mood  Ext: moves all four extremities freely, no peripheral edema    LABS  10-25    140  |  106  |  13  ----------------------------<  131<H>  3.5   |  26  |  1.16    Ca    8.6      25 Oct 2020 20:45    TPro  6.1  /  Alb  3.3  /  TBili  0.8  /  DBili  x   /  AST  21  /  ALT  25  /  AlkPhos  60  10-25    CBC Full  -  ( 26 Oct 2020 01:56 )  WBC Count : 14.07 K/uL  Hemoglobin : 10.6 g/dL  Hematocrit : 31.8 %  Platelet Count - Automated : 241 K/uL  Mean Cell Volume : 91.4 fl  Mean Cell Hemoglobin : 30.5 pg  Mean Cell Hemoglobin Concentration : 33.3 gm/dL  Auto Neutrophil # : 12.56 K/uL  Auto Lymphocyte # : 0.58 K/uL  Auto Monocyte # : 0.80 K/uL  Auto Eosinophil # : 0.05 K/uL  Auto Basophil # : 0.03 K/uL  Auto Neutrophil % : 89.2 %  Auto Lymphocyte % : 4.1 %  Auto Monocyte % : 5.7 %  Auto Eosinophil % : 0.4 %  Auto Basophil % : 0.2 %        RADIOLOGY  CT Abdomen and Pelvis w/ IV Cont:   EXAM:  CT ABDOMEN AND PELVIS IC                            PROCEDURE DATE:  10/25/2020          INTERPRETATION:  CLINICAL INFORMATION: Abdominal pain. Status post robotic prostatectomy on 10/20/2020.    COMPARISON: None.    PROCEDURE:  CT of the Abdomen and Pelvis was performed with intravenous contrast.  Intravenous contrast: 90 ml Omnipaque 350. 10 ml discarded.  Oral contrast: None.  Sagittal and coronal reformats were performed.    FINDINGS:  LOWER CHEST: Within normal limits.    LIVER: Within normal limits.  BILE DUCTS: Normal caliber.  GALLBLADDER: Within normal limits.  SPLEEN: Within normal limits.  PANCREAS: Within normal limits.  ADRENALS: Within normal limits.  KIDNEYS/URETERS: The kidneys enhance symmetrically without hydronephrosis or renal stones.  Incidental 5 mm hypoattenuating focus in the upper pole of the left kidney is too small to characterize by CT scan.  BLADDER: Collapsed around a Gambino catheter.  REPRODUCTIVE ORGANS: Status post prostatectomy.  Nonspecific calcification in the seminal vesicles.    BOWEL: No bowel obstruction, bowel wall thickening or pneumatosis.  Normal appendix.  Nonspecific presacral edema.  PERITONEUM: Moderate volume of hemoperitoneum throughout the abdomen and pelvis, and mild pneumoperitoneum likely postoperative.  VESSELS: Within normal limits.  RETROPERITONEUM/LYMPH NODES: Ovoid cystic foci along the pelvic sidewalls measuring 5.4 x 3.2 cm on the right and 3.4 x 2.3 cm on the left.  ABDOMINAL WALL: Normal postsurgical changes with small amount of soft tissue gas in the ventral left abdominal wall and left lower chest wall  BONES: Small disc bulges throughout the lumbar spine.  Mild spinal canal stenosis at L3-L4 and L4-L5.  Mild to moderate neural foraminal narrowing at L4-L5 and L5-S1 on the left side.  Mild bilateral hip osteoarthrosis.  Nonspecific cirrhotic foci in the bony pelvis and proximal femurs, probably bone islands.  IMPRESSION:  1.  Status post prostatectomy.  Moderate volume hemoperitoneum.  No evidence of active IV contrast extravasation.  2.  Mild postoperative pneumoperitoneum and mild postoperative gas in the abdominal wall/chest wall.  3.  Ovoid cystic foci along the pelvic sidewalls, measuring 5.4 x 3.2 cm on the right side and 3.4 x 2.37 on the left side, are compatible with postoperative lymphocele.              PRABHA ROSENBERG MD; Attending Radiologist  This document has been electronically signed. Oct 26 2020 12:14AM (10-25-20 @ 23:53)        ASSESSMENT & PLAN      Abdominal pain  - Likely due to bladder spasms  - PE and CT imaging non concerning. no evidence to suggest bowel/bladder injury  - Ok to be discharged home from  standpoint with ditropan 10mg TID PRn  - f/u Dr Brito office tomorrow for TOV  - discharge patient home with levaquin 500mg QD x 3 days    lymphocele  - expected after PLND    Thank you for allowing me to participate in the care of this patient  Please call if there are questions or concerns    I explained the various issues and complexities regarding their current urological condition(s) and treatment options. The patient verbalized understanding and I answered all of their questions to satisfaction.     Sobia Romano MD  hospitals  [phone]    10-26-20 @ 09:17

## 2020-10-26 NOTE — H&P ADULT - HISTORY OF PRESENT ILLNESS
Pleasant 56 y/o male with  prostate CA, s/p robotic prostatectomy at [facility] on 10/20/20 by Dr Garvin.  Pt well until last evening when  he accidentally tugged at his  Lindo catheter, and several mins later developed such excruciating low abdo pain that he couldn't breathe and he  almost passed out. Per ER notes pt was given fentanyl by EMS.   Pt got oxybutynin and morphine here, pain is tolerable, low abdo feels a little tender. No cp palps sob is passing gas. No pain in the urethra.   10 point ROS is otherwise negative.    PMH:  denies any h/o MI CAD CVA or other Ca  Prostate Ca    FH:  parents and sibs are healthy    SH:  never smoked; occ red wine; no recreational drugs

## 2020-10-26 NOTE — H&P ADULT - NSHPLABSRESULTS_GEN_ALL_CORE
LABS: All Labs Reviewed:                        10.6   14.07 )-----------( 241      ( 26 Oct 2020 01:56 )             31.8     140  |  106  |  13  ----------------------------<  131<H>  3.5   |  26  |  1.16      RADIOLOGY/EKG:  < from: CT Angio Abdomen and Pelvis w/ IV Cont (10.26.20 @ 05:22) >  1. Small to moderate amount of abdominal and pelvic hemoperitoneum, not significant changed, without evidence of active intravenous contrast extravasation.  2. Circumferential urinary bladder wall thickening which could be postprocedural in nature versus a cystitis. Correlate with urinalysis            cefTRIAXone Injectable. 1000 milliGRAM(s) IV Push once  morphine  - Injectable 4 milliGRAM(s) IV Push every 6 hours PRN  ondansetron Injectable 4 milliGRAM(s) IV Push every 6 hours PRN  oxybutynin 5 milliGRAM(s) Oral two times a day  oxycodone    5 mG/acetaminophen 325 mG 1 Tablet(s) Oral every 4 hours PRN  polyethylene glycol 3350 17 Gram(s) Oral two times a day  senna 2 Tablet(s) Oral at bedtime PRN

## 2020-10-26 NOTE — ED ADULT NURSE REASSESSMENT NOTE - NS ED NURSE REASSESS COMMENT FT1
Patient received from previous nurse. Pt is A&Ox4, VSS on RA. Pt is resting comfortably in their bed at this time, denies any pain or discomfort at this time. Lindo in place draining without any complications. Safety and comfort measures in place. Hourly rounding will be done on my time. Will continue to monitor.

## 2020-10-26 NOTE — DISCHARGE NOTE PROVIDER - NSDCMRMEDTOKEN_GEN_ALL_CORE_FT
acetaminophen 325 mg oral tablet: 2 tab(s) orally every 8 hours, As needed, Temp greater or equal to 38C (100.4F), Mild Pain (1 - 3)  multivitamin: 1 tab(s) orally once a day  ondansetron 4 mg oral tablet, disintegratin tab(s) orally every 8 hours, As Needed for nausea   oxybutynin 5 mg oral tablet: 1 tab(s) orally 3 times a day   senna oral tablet: 2 tab(s) orally once a day (at bedtime), As needed, Constipation  Vitamin B12 1000 mcg oral tablet: 1 tab(s) orally once a day

## 2020-10-27 LAB
CULTURE RESULTS: NO GROWTH — SIGNIFICANT CHANGE UP
SPECIMEN SOURCE: SIGNIFICANT CHANGE UP

## 2020-10-31 LAB
CULTURE RESULTS: SIGNIFICANT CHANGE UP
CULTURE RESULTS: SIGNIFICANT CHANGE UP
SPECIMEN SOURCE: SIGNIFICANT CHANGE UP
SPECIMEN SOURCE: SIGNIFICANT CHANGE UP

## 2020-11-03 DIAGNOSIS — K66.1 HEMOPERITONEUM: ICD-10-CM

## 2020-11-03 DIAGNOSIS — I89.8 OTHER SPECIFIED NONINFECTIVE DISORDERS OF LYMPHATIC VESSELS AND LYMPH NODES: ICD-10-CM

## 2020-11-03 DIAGNOSIS — M51.26 OTHER INTERVERTEBRAL DISC DISPLACEMENT, LUMBAR REGION: ICD-10-CM

## 2020-11-03 DIAGNOSIS — R10.30 LOWER ABDOMINAL PAIN, UNSPECIFIED: ICD-10-CM

## 2020-11-03 DIAGNOSIS — N32.89 OTHER SPECIFIED DISORDERS OF BLADDER: ICD-10-CM

## 2020-11-03 DIAGNOSIS — Z90.79 ACQUIRED ABSENCE OF OTHER GENITAL ORGAN(S): ICD-10-CM

## 2020-11-03 DIAGNOSIS — Z88.0 ALLERGY STATUS TO PENICILLIN: ICD-10-CM

## 2020-11-03 DIAGNOSIS — K66.8 OTHER SPECIFIED DISORDERS OF PERITONEUM: ICD-10-CM

## 2020-11-03 DIAGNOSIS — Z85.46 PERSONAL HISTORY OF MALIGNANT NEOPLASM OF PROSTATE: ICD-10-CM

## 2020-11-20 ENCOUNTER — NON-APPOINTMENT (OUTPATIENT)
Age: 55
End: 2020-11-20

## 2021-06-09 NOTE — DISCHARGE NOTE PROVIDER - CARE PROVIDERS DIRECT ADDRESSES
[de-identified] : Mr. Horton presents to the office s/p C3-7 posterior fusion on 12/7/17.  Overall his symptoms are stable. [Ataxia] : no ataxia [Incontinence] : no incontinence [Loss of Dexterity] : good dexterity [Urinary Ret.] : no urinary retention ,DirectAddress_Unknown
